# Patient Record
Sex: MALE | Race: WHITE | Employment: STUDENT | URBAN - METROPOLITAN AREA
[De-identification: names, ages, dates, MRNs, and addresses within clinical notes are randomized per-mention and may not be internally consistent; named-entity substitution may affect disease eponyms.]

---
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
## 2022-07-12 ENCOUNTER — HOSPITAL ENCOUNTER (OUTPATIENT)
Age: 22
Discharge: HOME | End: 2022-07-12
Attending: EMERGENCY MEDICINE
Payer: COMMERCIAL

## 2022-07-12 VITALS
WEIGHT: 140 LBS | DIASTOLIC BLOOD PRESSURE: 90 MMHG | HEIGHT: 72 IN | OXYGEN SATURATION: 96 % | HEART RATE: 88 BPM | TEMPERATURE: 99 F | BODY MASS INDEX: 18.96 KG/M2 | RESPIRATION RATE: 16 BRPM | SYSTOLIC BLOOD PRESSURE: 158 MMHG

## 2022-07-12 DIAGNOSIS — U07.1 COVID-19 VIRUS INFECTION: Primary | ICD-10-CM

## 2022-07-12 LAB — SARS-COV-2 RNA RESP QL NAA+PROBE: DETECTED

## 2022-07-12 PROCEDURE — 99202 OFFICE O/P NEW SF 15 MIN: CPT

## 2022-10-12 ENCOUNTER — HOSPITAL ENCOUNTER (OUTPATIENT)
Age: 22
Discharge: HOME | End: 2022-10-12
Attending: EMERGENCY MEDICINE
Payer: COMMERCIAL

## 2022-10-12 ENCOUNTER — APPOINTMENT (OUTPATIENT)
Dept: CT IMAGING | Age: 22
End: 2022-10-12
Attending: EMERGENCY MEDICINE
Payer: COMMERCIAL

## 2022-10-12 VITALS
RESPIRATION RATE: 18 BRPM | SYSTOLIC BLOOD PRESSURE: 151 MMHG | OXYGEN SATURATION: 97 % | BODY MASS INDEX: 18.96 KG/M2 | DIASTOLIC BLOOD PRESSURE: 89 MMHG | TEMPERATURE: 99 F | WEIGHT: 140 LBS | HEART RATE: 77 BPM | HEIGHT: 72 IN

## 2022-10-12 DIAGNOSIS — Z20.822 LAB TEST NEGATIVE FOR COVID-19 VIRUS: ICD-10-CM

## 2022-10-12 DIAGNOSIS — J06.9 UPPER RESPIRATORY TRACT INFECTION, UNSPECIFIED TYPE: Primary | ICD-10-CM

## 2022-10-12 LAB — SARS-COV-2 RNA RESP QL NAA+PROBE: NOT DETECTED

## 2022-10-12 PROCEDURE — 99213 OFFICE O/P EST LOW 20 MIN: CPT

## 2022-10-12 RX ORDER — SUMATRIPTAN 6 MG/.5ML
6 INJECTION, SOLUTION SUBCUTANEOUS ONCE
Status: DISCONTINUED | OUTPATIENT
Start: 2022-10-12 | End: 2022-10-12

## 2022-10-12 NOTE — ED INITIAL ASSESSMENT (HPI)
Patient present self with stuffy nose since Sunday, cough for 4 days and fever yesterday tmax 100.2 Exposed to an individual with confirmed positive Covid infection.

## 2023-05-16 ENCOUNTER — HOSPITAL ENCOUNTER (OUTPATIENT)
Age: 23
Discharge: HOME | End: 2023-05-16
Attending: EMERGENCY MEDICINE
Payer: COMMERCIAL

## 2023-05-16 VITALS
BODY MASS INDEX: 21.2 KG/M2 | RESPIRATION RATE: 20 BRPM | HEIGHT: 73 IN | HEART RATE: 96 BPM | TEMPERATURE: 99 F | SYSTOLIC BLOOD PRESSURE: 149 MMHG | WEIGHT: 160 LBS | DIASTOLIC BLOOD PRESSURE: 86 MMHG | OXYGEN SATURATION: 98 %

## 2023-05-16 DIAGNOSIS — U07.1 COVID-19 VIRUS INFECTION: Primary | ICD-10-CM

## 2023-05-16 LAB
S PYO AG THROAT QL IA.RAPID: NEGATIVE
SARS-COV-2 RNA RESP QL NAA+PROBE: DETECTED

## 2023-05-16 PROCEDURE — 99212 OFFICE O/P EST SF 10 MIN: CPT

## 2023-05-16 PROCEDURE — 99213 OFFICE O/P EST LOW 20 MIN: CPT

## 2023-05-16 PROCEDURE — 87651 STREP A DNA AMP PROBE: CPT | Performed by: EMERGENCY MEDICINE

## 2023-05-16 NOTE — ED INITIAL ASSESSMENT (HPI)
Patient woke up with sore throat and cough on friday. Symptoms worsening yesterday, pain with coughing. States yesterday had fever. Continues to have some nasal congestion as well. Symptoms slightly improved today.

## 2025-05-17 ENCOUNTER — HOSPITAL ENCOUNTER (OUTPATIENT)
Age: 25
Discharge: HOME | End: 2025-05-17
Payer: COMMERCIAL

## 2025-05-17 ENCOUNTER — APPOINTMENT (OUTPATIENT)
Dept: GENERAL RADIOLOGY | Age: 25
End: 2025-05-17
Attending: NURSE PRACTITIONER
Payer: COMMERCIAL

## 2025-05-17 VITALS
DIASTOLIC BLOOD PRESSURE: 93 MMHG | BODY MASS INDEX: 22.4 KG/M2 | OXYGEN SATURATION: 99 % | HEIGHT: 71 IN | RESPIRATION RATE: 18 BRPM | TEMPERATURE: 98 F | HEART RATE: 77 BPM | SYSTOLIC BLOOD PRESSURE: 147 MMHG | WEIGHT: 160 LBS

## 2025-05-17 DIAGNOSIS — S93.401A MILD SPRAIN OF RIGHT ANKLE, INITIAL ENCOUNTER: Primary | ICD-10-CM

## 2025-05-17 PROCEDURE — 73610 X-RAY EXAM OF ANKLE: CPT | Performed by: NURSE PRACTITIONER

## 2025-05-17 PROCEDURE — 99213 OFFICE O/P EST LOW 20 MIN: CPT

## 2025-05-17 NOTE — ED PROVIDER NOTES
Patient Seen in: [facility]    History   No chief complaint on file.    Stated Complaint: Right ankle pain    HPI    HPI: Lance Nowak is a 24 year old male who presents after an injury to the right  ankle and foot with a twisting, inversion motion 1 week ago. The patient was able to bear weight immediately after the injury  Patient complains of constant dull pain, moderate to severe, non-radiating, and worse with movement and weight bearing. No knee pain. No other joint pain or injuries. no history of ankle injury or surgeries.        Past Medical History[1]    Past Surgical History[2]         Family History[3]    Short Social Hx on File[4]    Review of Systems    Positive for stated complaint: Right ankle pain  Other systems are as noted in HPI.  Constitutional and vital signs reviewed.      All other systems reviewed and negative except as noted above.    PSFH elements reviewed from today and agreed except as otherwise stated in HPI.    Physical Exam     ED Triage Vitals [05/17/25 1117]   BP (!) 147/93   Pulse 77   Resp 18   Temp 98.4 °F (36.9 °C)   Temp src Oral   SpO2 99 %   O2 Device None (Room air)       Current:BP (!) 147/93   Pulse 77   Temp 98.4 °F (36.9 °C) (Oral)   Resp 18   Ht 180.3 cm (5' 11\")   Wt 72.6 kg   SpO2 99%   BMI 22.32 kg/m²         Physical Exam      MENTAL STATUS: Alert, oriented, and cooperative. No focal deficit  HEAD: Atraumatic  NECK: Supple, full range of motion without pain or paresthesias  EXTREMITIES: The right  ankle is swollen and tender over the lateral malleolus with no tenderness over the medial malleolus and the skin is intact. There is no ligamentous instability. There is no notable deformity. There is no tenderness over the base of the fifth metatarsal. Achilles is palpated and intact functionally. There is no tenderness over the ipsilateral fibular head. Full range of motion of the knee on that side without pain. No proximal tib fib  tenderness. The foot and toes are warm and well-perfused.   NEURO:Sensation to touch is intact.  SKIN: No open wounds, no rashes.  PSYCH: Normal affect. Calm and cooperative.    ED Course   Labs Reviewed - No data to display  I have personally  reviewed available prior medical records for any recent pertinent discharge summaries/testing. Patient/family updated on results and plan, a verbalized understanding and agreement with the plan.  I explained to the patient that emergent conditions may arise and to go to the ER for new, worsening or any persistent conditions. I've explained the importance of taking all medicatons as prescribed, follow up, and return precuations,  All questions answered.    Please note that this report has been produced using speech recognition software and may contain errors related to that system including, but not limited to, errors in grammar, punctuation, and spelling, as well as words and phrases that possibly may have been recognized inappropriately.  If there are any questions or concerns, contact the dictating provider for clarification.  MDM     XR ANKLE (MIN 3 VIEWS), RIGHT (CPT=73610)  Result Date: 5/17/2025  CONCLUSION:  The examination is within normal limits.    LOCATION:  [facility]   Dictated by (CST): Paul Amaya DO on 5/17/2025 at 12:49 PM     Finalized by (CST): Paul Amaya DO on 5/17/2025 at 12:56 PM         Patient presents for injury to the extremity. History and physical exam as above.  X-rays were performed which did not reveal any acute fracture.  Range of motion is intact.  No overlying erythema, warmth or induration to indicate infection.  Extremity is neurovascularly intact.  No overlying abrasion or laceration. Presentation clinically consistent with sprain. Instructions given on Rice therapy and over-the-counter medications for pain management.  XR ANKLE (MIN 3 VIEWS), RIGHT (CPT=73610)  Result Date: 5/17/2025  CONCLUSION:  The examination is within normal limits.     LOCATION:  [facility]   Dictated by (CST): Paul Amaya DO on 5/17/2025 at 12:49 PM     Finalized by (CST): Paul Amaya DO on 5/17/2025 at 12:56 PM         Recommend close follow-up with PCP.  Discussed possibility of missed occult fracture and need for repeat imaging if pain is persistent after 2 weeks.  Return to ED precautions discussed with the patient.    Disposition and Plan     Clinical Impression:  1. Mild sprain of right ankle, initial encounter        Disposition:  Discharge    Follow-up:  Yisel Woods DO  [address] [address]  [phone]            Medications Prescribed:  There are no discharge medications for this patient.                     [1] No past medical history on file.  [2]   Past Surgical History:  Procedure Laterality Date    Adenoidectomy  11/24//2015    T&a Bilateral 11/24/2015    Procedure: TONSILLECTOMY ADENOIDECTOMY;  Surgeon: Nadeem Bah MD;  Location:  MAIN OR    Tonsillectomy  11/24/2015   [3]   Family History  Problem Relation Age of Onset    Diabetes Paternal Grandmother     Heart Disease Paternal Grandfather         CARTY    Other (Other[other]) Sister         COD costello syndrome   [4]   Social History  Socioeconomic History    Marital status: Single   Tobacco Use    Smoking status: Never    Smokeless tobacco: Never   Vaping Use    Vaping status: Never Used   Substance and Sexual Activity    Alcohol use: No    Drug use: No

## 2025-06-01 ENCOUNTER — HOSPITAL ENCOUNTER (OUTPATIENT)
Age: 25
Discharge: HOME | End: 2025-06-01
Payer: COMMERCIAL

## 2025-06-01 VITALS
TEMPERATURE: 98 F | RESPIRATION RATE: 18 BRPM | DIASTOLIC BLOOD PRESSURE: 97 MMHG | WEIGHT: 160 LBS | HEART RATE: 80 BPM | OXYGEN SATURATION: 100 % | BODY MASS INDEX: 22 KG/M2 | SYSTOLIC BLOOD PRESSURE: 139 MMHG

## 2025-06-01 DIAGNOSIS — R30.0 DYSURIA: Primary | ICD-10-CM

## 2025-06-01 DIAGNOSIS — R36.9 DRAINAGE FROM PENIS: ICD-10-CM

## 2025-06-01 LAB
BILIRUB UR QL STRIP: NEGATIVE
CLARITY UR: CLEAR
GLUCOSE UR STRIP-MCNC: NEGATIVE MG/DL
KETONES UR STRIP-MCNC: 15 MG/DL
NITRITE UR QL STRIP: NEGATIVE
PH UR STRIP: 6 [PH]
PROT UR STRIP-MCNC: NEGATIVE MG/DL
SP GR UR STRIP: <=1.005
UROBILINOGEN UR STRIP-ACNC: <2 MG/DL

## 2025-06-01 PROCEDURE — 87591 N.GONORRHOEAE DNA AMP PROB: CPT | Performed by: NURSE PRACTITIONER

## 2025-06-01 PROCEDURE — 87086 URINE CULTURE/COLONY COUNT: CPT | Performed by: NURSE PRACTITIONER

## 2025-06-01 PROCEDURE — 99214 OFFICE O/P EST MOD 30 MIN: CPT

## 2025-06-01 PROCEDURE — 81002 URINALYSIS NONAUTO W/O SCOPE: CPT

## 2025-06-01 PROCEDURE — 87491 CHLMYD TRACH DNA AMP PROBE: CPT | Performed by: NURSE PRACTITIONER

## 2025-06-01 PROCEDURE — 96372 THER/PROPH/DIAG INJ SC/IM: CPT

## 2025-06-01 NOTE — ED PROVIDER NOTES
Patient Seen in: [facility]       The following individual(s) verbally consented to be recorded using ambient AI listening technology and understand that they can each withdraw their consent to this listening technology at any point by asking the clinician to turn off or pause the recording:    Patient name: Lance Nowak  Additional names:        History  Chief Complaint   Patient presents with    Urinary Symptoms    Rash Skin Problem     Stated Complaint: Rash    Subjective:   HPI     A 24-year-old male who presents with burning during urination and penile discharge.    He experiences intermittent burning with urination, occurring every other time he urinates, along with clear penile discharge. No testicular pain, swelling, or rashes.    He has no history of sexually transmitted diseases or urinary tract infections. He is circumcised and sexually active with females, consistently using protection.        Objective:     No pertinent past medical history.            No pertinent past surgical history.              No pertinent social history.            Review of Systems   All other systems reviewed and are negative.      Positive for stated complaint: Rash  Other systems are as noted in HPI.  Constitutional and vital signs reviewed.      All other systems reviewed and negative except as noted above.                  Physical Exam    ED Triage Vitals [06/01/25 1012]   BP (!) 163/97   Pulse 80   Resp 18   Temp 98.1 °F (36.7 °C)   Temp src Oral   SpO2 100 %   O2 Device None (Room air)       Current Vitals:   Vital Signs  BP: (!) 139/97  Pulse: 80  Resp: 18  Temp: 98.1 °F (36.7 °C)  Temp src: Oral    Oxygen Therapy  SpO2: 100 %  O2 Device: None (Room air)            Physical Exam  Vitals and nursing note reviewed.   Constitutional:       General: He is not in acute distress.     Appearance: He is well-developed. He is not ill-appearing or toxic-appearing.   Cardiovascular:      Rate and Rhythm:  Normal rate and regular rhythm.      Heart sounds: Normal heart sounds.   Pulmonary:      Effort: Pulmonary effort is normal.      Breath sounds: Normal breath sounds.   Abdominal:      Tenderness: There is no right CVA tenderness or left CVA tenderness.   Skin:     General: Skin is warm and dry.   Neurological:      Mental Status: He is alert and oriented to person, place, and time.                 ED Course  Labs Reviewed   [facility] POCT URINALYSIS DIPSTICK - Abnormal; Notable for the following components:       Result Value    Ketone, Urine 15 (*)     Blood, Urine Small (*)     Leukocyte esterase urine Trace (*)     All other components within normal limits   CHLAMYDIA/GONOCOCCUS, MATHEW   URINE CULTURE, ROUTINE                              Medical Decision Making  23 yo with dysuria and penile drainage- clear in color    Pertinent Labs & Imaging studies reviewed. (See chart for details).  Patient coming in with dysuria, penile drainage.   Differential diagnosis includes but not limited to uti, gc, urethritis  Labs reviewed urine dip with trace leuks- culture sent. GC sent.   Will treat for dysuria, drainage from penis.  Will discharge on Rocephin given in office.  Will treat according to urine culture as well as GC testing. Patient/Parent is comfortable with this plan.            Problems Addressed:  Drainage from penis: acute illness or injury  Dysuria: acute illness or injury        Disposition and Plan     Clinical Impression:  1. Dysuria    2. Drainage from penis         Disposition:  Discharge  6/1/2025 10:41 am    Follow-up:  No follow-up provider specified.        Medications Prescribed:  There are no discharge medications for this patient.            Supplementary Documentation:

## 2025-06-01 NOTE — DISCHARGE INSTRUCTIONS
We will call with urine culture and GC testing for further treatment.   Avoid sexual activity until you get results.

## 2025-06-02 LAB
C TRACH DNA SPEC QL NAA+PROBE: POSITIVE
N GONORRHOEA DNA SPEC QL NAA+PROBE: NEGATIVE

## 2025-06-02 RX ORDER — DOXYCYCLINE 100 MG/1
100 CAPSULE ORAL 2 TIMES DAILY
Qty: 20 CAPSULE | Refills: 0 | Status: SHIPPED | OUTPATIENT
Start: 2025-06-02 | End: 2025-06-12